# Patient Record
Sex: MALE | Race: WHITE | Employment: FULL TIME | URBAN - METROPOLITAN AREA
[De-identification: names, ages, dates, MRNs, and addresses within clinical notes are randomized per-mention and may not be internally consistent; named-entity substitution may affect disease eponyms.]

---

## 2022-01-10 ENCOUNTER — OFFICE VISIT (OUTPATIENT)
Dept: ORTHOPEDIC SURGERY | Age: 57
End: 2022-01-10
Payer: COMMERCIAL

## 2022-01-10 VITALS — BODY MASS INDEX: 27.4 KG/M2 | WEIGHT: 185 LBS | HEIGHT: 69 IN

## 2022-01-10 DIAGNOSIS — M17.12 LOCALIZED OSTEOARTHRITIS OF LEFT KNEE: Primary | ICD-10-CM

## 2022-01-10 DIAGNOSIS — M25.562 LEFT KNEE PAIN, UNSPECIFIED CHRONICITY: ICD-10-CM

## 2022-01-10 PROCEDURE — 20610 DRAIN/INJ JOINT/BURSA W/O US: CPT | Performed by: ORTHOPAEDIC SURGERY

## 2022-01-10 PROCEDURE — 99204 OFFICE O/P NEW MOD 45 MIN: CPT | Performed by: ORTHOPAEDIC SURGERY

## 2022-01-10 RX ORDER — TRIAMCINOLONE ACETONIDE 40 MG/ML
40 INJECTION, SUSPENSION INTRA-ARTICULAR; INTRAMUSCULAR ONCE
Status: COMPLETED | OUTPATIENT
Start: 2022-01-10 | End: 2022-01-10

## 2022-01-10 RX ORDER — MELOXICAM 15 MG/1
15 TABLET ORAL DAILY PRN
Qty: 30 TABLET | Refills: 0 | Status: SHIPPED | OUTPATIENT
Start: 2022-01-10 | End: 2022-01-10 | Stop reason: CLARIF

## 2022-01-10 RX ORDER — MELOXICAM 15 MG/1
15 TABLET ORAL DAILY PRN
Qty: 30 TABLET | Refills: 0 | Status: SHIPPED | OUTPATIENT
Start: 2022-01-10 | End: 2022-05-04

## 2022-01-10 RX ORDER — ROPIVACAINE HYDROCHLORIDE 5 MG/ML
20 INJECTION, SOLUTION EPIDURAL; INFILTRATION; PERINEURAL ONCE
Status: COMPLETED | OUTPATIENT
Start: 2022-01-10 | End: 2022-01-10

## 2022-01-10 RX ADMIN — ROPIVACAINE HYDROCHLORIDE 20 MG: 5 INJECTION, SOLUTION EPIDURAL; INFILTRATION; PERINEURAL at 09:17

## 2022-01-10 RX ADMIN — TRIAMCINOLONE ACETONIDE 40 MG: 40 INJECTION, SUSPENSION INTRA-ARTICULAR; INTRAMUSCULAR at 09:17

## 2022-01-10 NOTE — PROGRESS NOTES
ORTHOPAEDIC SURGERY H&P / CONSULTATION NOTE    Chief complaint: No chief complaint on file. History of present illness: The patient is a 64 y.o. male with subjective symptoms of left knee pain. The chief complaint is located at anterior aspect anterolateral aspect of the knee primarily, occasional medial based. Duration of symptoms has been for several years. The severity of symptoms is rated at 5/10 pain on intake form. Patient denies consistent use of anti-inflammatories. Denies trauma. He states dull throbbing achy pain constant with walking 5000-15,000 steps daily for his job. He states 3 previous knee arthroscopic surgeries. He states primary symptoms of dull throbbing aching pain, occasional sharp pain medial base however denies locking or catching symptoms. He denies gross mechanical twisting knee pain symptoms. He states he uses ice and heat. He has not done any therapy as of recent. The patient has tried the below listed items prior to today's consultation for above listed chief complaint.     -   Over-the-counter anti-inflammatories/prescription medication anti-inflammatory. -   Physical therapy / guided home exercise program -     -   Previous corticosteroid injections    Past medical history:  No past medical history on file. Past surgical history:  No past surgical history on file. Allergies:  Not on File      Medications: No current outpatient medications on file. Social history: Denies IV drug use.     Social History     Socioeconomic History    Marital status: Not on file     Spouse name: Not on file    Number of children: Not on file    Years of education: Not on file    Highest education level: Not on file   Occupational History    Not on file   Tobacco Use    Smoking status: Not on file    Smokeless tobacco: Not on file   Substance and Sexual Activity    Alcohol use: Not on file    Drug use: Not on file    Sexual activity: Not on file   Other Topics Concern    Not on file   Social History Narrative    Not on file     Social Determinants of Health     Financial Resource Strain:     Difficulty of Paying Living Expenses: Not on file   Food Insecurity:     Worried About Running Out of Food in the Last Year: Not on file    Nathaniel of Food in the Last Year: Not on file   Transportation Needs:     Lack of Transportation (Medical): Not on file    Lack of Transportation (Non-Medical): Not on file   Physical Activity:     Days of Exercise per Week: Not on file    Minutes of Exercise per Session: Not on file   Stress:     Feeling of Stress : Not on file   Social Connections:     Frequency of Communication with Friends and Family: Not on file    Frequency of Social Gatherings with Friends and Family: Not on file    Attends Denominational Services: Not on file    Active Member of [address] [place] [address] or Organizations: Not on file    Attends Club or Organization Meetings: Not on file    Marital Status: Not on file   Intimate Partner Violence:     Fear of Current or Ex-Partner: Not on file    Emotionally Abused: Not on file    Physically Abused: Not on file    Sexually Abused: Not on file   Housing Stability:     Unable to Pay for Housing in the Last Year: Not on file    Number of [place] in the Last Year: Not on file    Unstable Housing in the Last Year: Not on file     Tobacco use. Social History     Tobacco Use   Smoking Status Not on file   Smokeless Tobacco Not on file     Em[place]    [place]    Review of systems: Patient denies any fevers chills chest pain shortness of breath nausea vomiting significant weight loss any change in voiding or bowel movements. Patient denies any significant numbness or tingling at baseline as it relates to this presenting symptom/chief complaint. The patient denies any significant problems with skin or any significant allergies.        Physical examination:  There is no height or weight on file to calculate BMI. AAOx3, NCAT  EOMI  MMM  RR  Unlabored breathing, no wheezing  Skin intact BUE and BLE, warm and moist  Bilateral lower extremity examination specific to subjective symptoms  Exam Left Lower Extremity  Negative effusion,   0/110/0 limited secondary to pain active ROM (E/F/Lag), same P assive ROM (E/F/Lag), negative anterior Drawer, 1A Lachman, negative posterior Drawer,   Stable varus/valgus at 0 and 30?,    trace medial  TTP Joint Line, negative Emigdio, positive clarks  Skin intact throughout  5/5 IP Q H TA G EHL  SILT DP SP LP MP S S  +2 DP pulse    Diagnostic imaging:  MY READ:  4V L knee 1/10/22: Patellofemoral and medial joint space narrowingmoderate arthrosis    Pertinent lab work:  None       Diagnosis Orders   1. Left knee pain, unspecified chronicity  XR KNEE LEFT (MIN 4 VIEWS)       Assessment and plan: 64 y.o. male with current subjective symptoms and physical exam findings with diagnostic imaging correlating to left knee osteoarthritis. -Time of 16 minutes was spent coordinating and discussing the clinical findings, reviewing diagnostic imaging as indicated, coordinating care with prior notes review and current clinical encounter documentation as it pertains to the patient's presenting subjective symptoms and diagnoses. -I reviewed with the patient the imaging findings as well as clinical exam and  how it correlates to subjective symptoms.  -I had a pleasant discussion with the patient today. I reviewed with him his radiographic imaging. I also reviewed with him how this correlates to his subjective symptoms of knee osteoarthritis. He does not seem to have significant mechanical twisting knee pain at this time. I recommended conservative care treatment options to include anti-inflammatory physical therapy and consideration for corticosteroid injection versus viscosupplementation injection.   He wishes to pursue nonoperative care  -Mobic 15 mg p.o. daily as needed pain has been prescribed  -Formal physical therapy is also been prescribed for left knee. Knee reconditioning and strengthening to include pain modalities. -I recommended low impact activities such as elliptical stationary bike swimming and walking  -I offered a corticosteroid injection to treat primary inflammation associated with knee osteoarthritis. Understanding the risks and benefits of this, the patient wished to proceed. --The patient was educated to the risks (infection and skin blanching to name a few) and benefits of the injection and understanding these risks and benefits, the patient wished to proceed and a verbal consent was obtained. If the patient verbalized the presence of diabetes or rheumatologic condition on chronic immunosuppresseants as a comorbidity upon direct questioning, an additional discussion was had detailing the potential increased risk of infection and potential increase in FSBG and to monitor FSBG and adjust medications as needed.  -After sterile prep of the left knee, a 5 cc corticosteroid injection (4cc ropivicaine and 1cc kenolog 40) was administered into the intra-articular space left knee. The patient tolerated the procedure well and started to have immediate improvement in pain/symptoms.  -Pending the results of this, should the patient have significant improvement in his symptoms then consideration for transition once inflammation has been managed from therapy anti-inflammatory orally and also steroid injection for viscosupplementation injection therapy.   Should he wish to pursue this, he will contact the office in 3 to 4 months for authorizations to be placed through insurance and that he will follow-up in the office thereafter for viscosupplementation injection therapy  -All questions answered to the patient's satisfaction and the patient expressed understanding and agreement with the above listed treatment plan  -Follow up in 3 to 4 months as needed or sooner should symptoms worsen  -Thank you for the clinical consultation and allowing me to participate in the patient's care. Electronically signed by Jovany Hinojosa MD on 1/10/22 at 8:31 AM ROSANNA Hinojosa MD       Orthopaedic Surgery-Sports Medicine        Disclaimer: This note was dictated with voice recognition software. Though review and correction are routinely performed, please contact the office/medical records for any errors requiring correction.

## 2022-02-21 ENCOUNTER — OFFICE VISIT (OUTPATIENT)
Dept: ORTHOPEDIC SURGERY | Age: 57
End: 2022-02-21
Payer: COMMERCIAL

## 2022-02-21 VITALS — BODY MASS INDEX: 27.4 KG/M2 | HEIGHT: 69 IN | WEIGHT: 185 LBS

## 2022-02-21 DIAGNOSIS — M25.562 ACUTE PAIN OF LEFT KNEE: ICD-10-CM

## 2022-02-21 DIAGNOSIS — M17.12 PRIMARY OSTEOARTHRITIS OF LEFT KNEE: Primary | ICD-10-CM

## 2022-02-21 PROCEDURE — 99213 OFFICE O/P EST LOW 20 MIN: CPT | Performed by: ORTHOPAEDIC SURGERY

## 2022-02-21 NOTE — PROGRESS NOTES
FOLLOW UP ORTHOPAEDIC NOTE    The patient follows up today for reevaluation of left knee pain. The patient states previous corticosteroid injection gave 90% symptom relief for 2 weeks. He has been doing physical therapy and is transition to a home program.  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  Again, this was Mobic as an NSAID. PE:  AAOx3  RR  Unlabored breathing  Skin warm and moist  Focused physical examination of the left knee  Unchanged clinical examination     Diagnosis Orders   1. Primary osteoarthritis of left knee     2. Acute pain of left knee         Assessment and plan: 62 male with continued subjective symptoms of left knee pain with known, correlating diagnosis of left knee osteoarthritis. -Time of 16 minutes was spent coordinating and discussing the clinical findings and diagnostic imaging results as they pertain to the patient's presenting subjective symptoms.  -I had a pleasant discussion with the patient. I reviewed with him that the Mobic prescription is a nonnarcotic nonsedative medication similar to Advil which she currently takes already. The goal of Mobic would be as a compliance for once a day medication as needed as Dr. Erick Riedel. He states that he will start taking this.  -He will also continue home physical therapy that he has been taught from formal physical therapy.  -While is encouraging that he got 90% symptom leaf from the corticosteroid injection, this was with limited duration of only 2 weeks. Previous discussion and additional discussion was taken today for potential consideration of viscosupplementation injection therapy into the left knee.   He will give the anti-inflammatory and continued physical therapy 3 to 4 weeks and should he have pain he will contact the office for consideration for authorizations to be submitted to insurance for a left knee intra-articular space viscosupplementation injection therapy for treatment of left knee osteoarthritis. -Otherwise I had a pleasant discussion with the patient with regard to definitive treatment being a total knee replacement. I am hopeful that with conservative care we will continue to manage his pain accordingly and reasonably so that he is able to continue his job and also ADLs. I did review with him long-term joint registry data with regard to survivorship of  total knee arthroplasties. He expressed understanding.  -Continue activity modification to include low impact, elliptical stationary bike swimming and walking  -All questions answered to the patient's satisfaction and the patient expressed understanding and agreement with the above listed treatment plan  -Follow up in 3 to 4 weeks he will contact the office for authorizations to be submitted and follow-up thereafter for viscosupplementation injection therapy.  -Thank you for the clinical consultation and allowing me to participate in the patient's care. Electronically signed by Clemencia Zavala MD on 2/21/22 at 9:23 AM EST         Clemencia Zavala MD       Orthopaedic Surgery-Sports Medicine    Disclaimer: This note was dictated with voice recognition software. Though review and correction are routinely performed, please contact the office/medical records for any errors requiring correction.

## 2022-04-11 RX ORDER — MELOXICAM 15 MG/1
TABLET ORAL
Qty: 30 TABLET | Refills: 0 | OUTPATIENT
Start: 2022-04-11

## 2022-04-13 ENCOUNTER — TELEPHONE (OUTPATIENT)
Dept: ORTHOPEDIC SURGERY | Age: 57
End: 2022-04-13

## 2022-04-14 NOTE — TELEPHONE ENCOUNTER
Called patient back and let him know that since we haven't seen him since January, we need him to come in for a follow up to refill his medication. He will come see us next week and an appointment was made.

## 2022-04-21 ENCOUNTER — OFFICE VISIT (OUTPATIENT)
Dept: ORTHOPEDIC SURGERY | Age: 57
End: 2022-04-21
Payer: COMMERCIAL

## 2022-04-21 ENCOUNTER — TELEPHONE (OUTPATIENT)
Dept: ORTHOPEDIC SURGERY | Age: 57
End: 2022-04-21

## 2022-04-21 VITALS — BODY MASS INDEX: 27.4 KG/M2 | HEIGHT: 69 IN | WEIGHT: 185 LBS

## 2022-04-21 DIAGNOSIS — S83.272D COMPLEX TEAR OF LATERAL MENISCUS OF LEFT KNEE AS CURRENT INJURY, SUBSEQUENT ENCOUNTER: Primary | ICD-10-CM

## 2022-04-21 DIAGNOSIS — M25.562 ACUTE PAIN OF LEFT KNEE: ICD-10-CM

## 2022-04-21 PROCEDURE — 99213 OFFICE O/P EST LOW 20 MIN: CPT | Performed by: ORTHOPAEDIC SURGERY

## 2022-04-21 RX ORDER — MELOXICAM 15 MG/1
15 TABLET ORAL DAILY PRN
Qty: 30 TABLET | Refills: 0 | Status: SHIPPED | OUTPATIENT
Start: 2022-04-21

## 2022-04-21 NOTE — PROGRESS NOTES
FOLLOW UP ORTHOPAEDIC NOTE    The patient follows up today for reevaluation of left knee. The patient states 3/10 pain. He feels the previous corticosteroid injection given 3 months ago provided 6 weeks worth of 80 to 90% symptom relief. He states he has noticed more so posterior and posterior lateral mechanical twisting knee pain at this time. He presents today to discuss additional treatment options. PE:  AAOx3  RR  Unlabored breathing  Skin warm and moist  Focused physical examination of the left knee  Unchanged range of motion however patient does have positive lateral Emigdio and positive lateral joint line tenderness. Remainder of examination unchanged with regard to neurovascular     Diagnosis Orders   1. Complex tear of lateral meniscus of left knee as current injury, subsequent encounter  MRI KNEE LEFT WO CONTRAST   2. Acute pain of left knee       Assessment and plan: 62 male with continued subjective symptoms of left knee pain with known, correlating diagnosis of left knee lateral meniscus tear in the setting of knee osteoarthritis. -Time of 16 minutes was spent coordinating and discussing the clinical findings and diagnostic imaging results as they pertain to the patient's presenting subjective symptoms.  -I had a pleasant discussion with the patient. I reviewed with him at this time I would like to obtain a MRI to better look at his lateral meniscus as he does have mechanical symptoms on examination as well as subjectively he feels that he does not twist or turn the knee without pain. While patient has primarily medial and patellofemoral arthrosis which is mild to moderate, I would like to obtain MRI to look at the lateral meniscus as he has failed activity modifications previous conservative care to include anti-inflammatories and corticosteroid injection  -Continue OTC Tylenol per bottle as needed discomfort.   Mobic 15 mg p.o. daily as needed pain #30 prescribed  -Activity modification to

## 2022-04-21 NOTE — TELEPHONE ENCOUNTER
Called patient to let them know that their MRI has been authorized and that they can call and schedule scan at their convenience. Also told them that they can call and schedule a f/u with Dr. Marlena Jarvis once they have MRI scheduled, leaving at least 2-3 days for our office to receive their results.

## 2022-05-03 ENCOUNTER — OFFICE VISIT (OUTPATIENT)
Dept: ORTHOPEDIC SURGERY | Age: 57
End: 2022-05-03
Payer: COMMERCIAL

## 2022-05-03 DIAGNOSIS — M94.262 CHONDROMALACIA OF KNEE, LEFT: ICD-10-CM

## 2022-05-03 DIAGNOSIS — S83.272D COMPLEX TEAR OF LATERAL MENISCUS OF LEFT KNEE AS CURRENT INJURY, SUBSEQUENT ENCOUNTER: Primary | ICD-10-CM

## 2022-05-03 DIAGNOSIS — M25.562 ACUTE PAIN OF LEFT KNEE: ICD-10-CM

## 2022-05-03 DIAGNOSIS — S83.232D COMPLEX TEAR OF MEDIAL MENISCUS OF LEFT KNEE AS CURRENT INJURY, SUBSEQUENT ENCOUNTER: ICD-10-CM

## 2022-05-03 PROCEDURE — 99214 OFFICE O/P EST MOD 30 MIN: CPT | Performed by: ORTHOPAEDIC SURGERY

## 2022-05-03 NOTE — LETTER
[address]   DR. Manolo Heredia     TODAY'S DATE: 5/3/22    PATIENT'S NAME: Jay Wetzel    PATIENT'S NUMBER: [id number]    : 1965    PREFERRED PHONE NUMBER: [phone]      WORK PHONE NUMBER: There is no work phone number on file. DIAGNOSIS:   1. Complex tear of lateral meniscus of left knee as current injury, subsequent encounter    2. Complex tear of medial meniscus of left knee as current injury, subsequent encounter    3. Chondromalacia of knee, left    4.  Acute pain of left knee        PROCEDURE: Left knee arthroscopic partial meniscectomy versus meniscal repair/root repair    SURGEON: Dr. Rivas Patches: Elective    [facility]: [facility]    ADMISSION STATUS: Outpatient/Same Day Surgery    ANESTHESIA: General  Pre-Op Block requested NONE    LENGTH OF SURGERY: 39 Min    EQUIPMENT REQUESTED: None      X-RAYS REQUIRED: Mini C-ARM    ANTIBIOTICS: Ancef 2gm IV x 1    MEDICATIONS: TXA 1gm IV x1 at induction of anesthesia AND TXA 1gm IV x 1 at wound closure    LABS: None          PCP: None Provider    H&P TO BE DONE BY THE PCP      CARDIAC CLEARANCE NEEDED: No     ALLERGIES: No Known Allergies    DME: Crutches and Knee Bregg Hinged Knee Brace    POST-OP VISIT: 10-12 days    OTHER INSTRUCTIONS/REMARKS: Arthrex root repair kit    INSURANCE INFORMATION: _________________________ CARD IN MEDIA IN EPIC___  CARD FAXED___    PRE-CERTIFICATION REQUIRED: YES   NO   PER _______________________    SURGERY SCHEDULED BY: ____________________________________    PATIENT CALLED AND CONFIRMED DATE & TIME: ______________________             Bryn Riddle MD       Orthopaedic Surgery - Sports Medicine

## 2022-05-03 NOTE — PROGRESS NOTES
FOLLOW UP ORTHOPAEDIC NOTE    The patient follows up today for reevaluation of left knee pain. The patient states 7/10 pain in the left knee. He states continued left knee pain mechanical twisting knee pain. He received his MRI last week and follows up for results. He states the pain is significantly affecting his ADLs and does not wish to go on with his knee function being limited as it is. PE:  AAOx3  RR  Unlabored breathing  Skin warm and moist  Focused physical examination of the left knee  Positive lateral joint line tenderness, trace medial joint line tenderness. Positive Domenico's  Remainder of examination unchanged    Pertinent radiographs/imaging:  MRI left knee 4/29/2022  Impression   CONCLUSION:   Small undersurface tear of the posterior horn/body junction of the medial meniscus and sequela of prior   meniscocapsular injury. Partial root avulsion of the anterior horn of the lateral meniscus. Severe mucoid degeneration of the ACL. Intact PCL. Grade 3-4 chondromalacia in the medial weight-bearing femorotibial compartment. Moderate knee joint effusion.          MY READ: ACL with mucoid degeneration albeit with fiber still present and correctly aligned. PCL intact MCL LCL intact. High-grade chondromalacia medial compartment, very mild chondromalacia patella. Positive articular sided posterior horn medial meniscus tear. Positive anterior lateral meniscal horn complex tear without associated ganglion cyst.  Intrameniscal ligament anteriorly is intact. No gross extrusion laterally. Very slight medial extrusion for the medial meniscus. Posterior root intact for medial and lateral meniscus. Possible involvement near the root anterior horn. Very small radial tear in the posterior horn lateral meniscus as this is appreciated on 1 image     Diagnosis Orders   1. Complex tear of lateral meniscus of left knee as current injury, subsequent encounter     2.  Complex tear of medial meniscus of left knee as current injury, subsequent encounter     3. Chondromalacia of knee, left     4. Acute pain of left knee         Assessment and plan: 62 male with continued subjective symptoms of left knee pain with known, correlating diagnosis of left knee lateral meniscus tear, medial meniscus tear in the setting of left knee chondromalacia. -Time of 23 minutes was spent coordinating and discussing the clinical findings and diagnostic imaging results as they pertain to the patient's presenting subjective symptoms.  -I had a pleasant discussion with the patient today and really reviewed with him his MRI findings. We reviewed how this correlates to his lateral based knee pain although where he has posterior lateral knee pain there is a very minimal small radial tear there. He does have anterior lateral complex meniscal tear as well near the root with possible partial involvement otherwise intrameniscal ligament is intact and there is no gross extrusion. I reviewed with him consideration for continued conservative care given continued mechanical twisting pain which he states significantly affect his ADLs and he is not wishing to move forward as he \" cannot live like this\". I reviewed with him consideration for left knee arthroscopic partial meniscectomy versus meniscal repair to include the root. Understanding the perioperative course and what this would entail as well as risks and benefits of nonoperative versus operative treatment measures he wishes to pursue surgery. I did review with him consideration for treatment measures for chondromalacia related symptoms which can always be managed postoperatively as well however given his mechanical twisting knee pain and still clinical examination findings, at this time he wishes to proceed with surgery.   --Currently the patient wishes to proceed with surgery given left knee symptoms from left knee mechanical twisting knee pain  as assessed by clinical exam and diagnostic imaging, which correlates to subjective symptoms. Understanding the risks and benefits of nonoperative versus operative treatments, he wishes to pursue surgery. Benefits of decreased pain and improved function were discussed with the patient as well as potential risks which included but were not limited to damage to nerves arteries tendons veins infection bleeding continued pain, new onset pain, stiffness, loss of range of motion, painful scar tissue, ligament instability, instability, chondromalacia related symptoms, need for revision surgery, need for additional surgery, venous thromboembolism, and ultimately death. Understanding this, a signed document consent will be placed in the patient's chart for left knee arthroscopic partial meniscectomy versus meniscal repair/root repair and chondroplasty.  -Continue activity modification to include low impact. Discontinue anti-inflammatory use at this time. OTC Tylenol per bottle parent discomfort. Ice and heat for symptomatic relief  -All questions answered to the patient's satisfaction and the patient expressed understanding and agreement with the above listed treatment plan  -Follow up in 10 to 12 days postop per routine  -Thank you for the clinical consultation and allowing me to participate in the patient's care. Electronically signed by Annika Marino MD on 5/3/22 at 4:45 PM EDT         Annika Marino MD       Orthopaedic Surgery-Sports Medicine    Disclaimer: This note was dictated with voice recognition software. Though review and correction are routinely performed, please contact the office/medical records for any errors requiring correction.

## 2022-05-04 ENCOUNTER — TELEPHONE (OUTPATIENT)
Dept: ORTHOPEDIC SURGERY | Age: 57
End: 2022-05-04

## 2022-05-04 NOTE — TELEPHONE ENCOUNTER
Auth: NPR  Date: 05/11/22  Reference # [id number]  Spoke with: TATYANA/JENNIE  Type of SX: OUTPATIENT  Location: [facility]  CPT: 20364, 55922   SX area: L KNEE  Insurance: JENNIE GIBSON OF [place]

## 2022-05-04 NOTE — PROGRESS NOTES
Leidy Carringtones    Age 62 y.o.    male    1965    MRN [id number]    5/11/2022  Arrival Time_____________  OR Time____________[address]     Procedure(s):  VIDEO ARTHROSCOPY LEFT KNEE, PARTIAL MENISCECTOMY VERSUS MENISCAL REPAIR/ROOT REPAIR                      General    Surgeon(s):  Marshal Andrews, MD       Phone [phone] ([place])     [address]  Cell         Work  _____________________________________________________________________  _____________________________________________________________________  _____________________________________________________________________  _____________________________________________________________________  _____________________________________________________________________    PCP _____________________________ Phone_________________     H&P__________________Bringing      Chart            Epic   DOS      Called________  EKG__________________Bringing      Chart            Epic   DOS      Called________  LAB__________________ Bringing      Chart            Epic   DOS      Called________  Cardiac Clearance_______Bringing      Chart            Epic      DOS      Called________    Cardiologist________________________ Phone___________________________    ? Yarsani concerns / Waiver on Chart            PAT Communications________________  ? Pre-op Instructions Given [place]          _________________________________  ? Directions to Surgery Center                          _________________________________  ? Transportation Home_______________      __________________________________  ?  Crutches/Walker__________________        __________________________________    ________Pre-op Orders   _______Transcribed    _______Wt.  ________Pharmacy          _______SCD  ______VTE     ______TED [facility]  _______  Surgery Consent    _______  Anesthesia Consent         COVID DATE______________LOCATION________________ RESULT__________

## 2022-05-04 NOTE — PROGRESS NOTES

## 2022-05-04 NOTE — PROGRESS NOTES
Obstructive Sleep Apnea (CHIN) Screening     Patient:  Francesco Brownlee    YOB: 1965      Medical Record #:  [id number]                     Date:  5/4/2022     1. Are you a loud and/or regular snorer? []  Yes       [x] No    2. Have you been observed to gasp or stop breathing during sleep? []  Yes       [x] No    3. Do you feel tired or groggy upon awakening or do you awaken with a headache?           []  Yes       [] No    4. Are you often tired or fatigued during the wake time hours? []  Yes       [] No    5. Do you fall asleep sitting, reading, watching TV or driving? []  Yes       [] No    6. Do you often have problems with memory or concentration? []  Yes       [] No    **If patient's score is ? 3 they are considered high risk for CHIN. An Anesthesia provider will evaluate the patient and develop a plan of care the day of surgery. Note:  If the patient's BMI is more than 35 kg m¯² , has neck circumference > 40 cm, and/or high blood pressure the risk is greater (© [employer], 2006).

## 2022-05-09 ENCOUNTER — TELEPHONE (OUTPATIENT)
Dept: ORTHOPEDIC SURGERY | Age: 57
End: 2022-05-09

## 2022-05-09 NOTE — TELEPHONE ENCOUNTER
General Question     Subject: [facility] has not sent him paperwork for his SX.   Patient:German Chicas Number: [phone]

## 2022-05-09 NOTE — TELEPHONE ENCOUNTER
Called patient back and he states that someone told him there was something to fill out online. I'm not sure what paperwork this could be as I do not have patients fill out online work. He wasn't sure and just wanted to check that he would be good to go after his H&P today.

## 2022-05-10 ENCOUNTER — ANESTHESIA EVENT (OUTPATIENT)
Dept: OPERATING ROOM | Age: 57
End: 2022-05-10
Payer: COMMERCIAL

## 2022-05-11 ENCOUNTER — ANESTHESIA (OUTPATIENT)
Dept: OPERATING ROOM | Age: 57
End: 2022-05-11
Payer: COMMERCIAL

## 2022-05-11 ENCOUNTER — HOSPITAL ENCOUNTER (OUTPATIENT)
Age: 57
Setting detail: OUTPATIENT SURGERY
Discharge: HOME | End: 2022-05-11
Attending: ORTHOPAEDIC SURGERY | Admitting: ORTHOPAEDIC SURGERY
Payer: COMMERCIAL

## 2022-05-11 VITALS
BODY MASS INDEX: 27.4 KG/M2 | DIASTOLIC BLOOD PRESSURE: 93 MMHG | OXYGEN SATURATION: 99 % | HEIGHT: 69 IN | SYSTOLIC BLOOD PRESSURE: 129 MMHG | WEIGHT: 185 LBS | TEMPERATURE: 96.9 F | RESPIRATION RATE: 14 BRPM | HEART RATE: 66 BPM

## 2022-05-11 VITALS
OXYGEN SATURATION: 98 % | SYSTOLIC BLOOD PRESSURE: 83 MMHG | RESPIRATION RATE: 6 BRPM | DIASTOLIC BLOOD PRESSURE: 49 MMHG

## 2022-05-11 DIAGNOSIS — Z47.89 ORTHOPEDIC AFTERCARE: Primary | ICD-10-CM

## 2022-05-11 PROCEDURE — 2500000003 HC RX 250 WO HCPCS: Performed by: ORTHOPAEDIC SURGERY

## 2022-05-11 PROCEDURE — 7100000000 HC PACU RECOVERY - FIRST 15 MIN: Performed by: ORTHOPAEDIC SURGERY

## 2022-05-11 PROCEDURE — 6360000002 HC RX W HCPCS: Performed by: ORTHOPAEDIC SURGERY

## 2022-05-11 PROCEDURE — 2720000010 HC SURG SUPPLY STERILE: Performed by: ORTHOPAEDIC SURGERY

## 2022-05-11 PROCEDURE — 6360000002 HC RX W HCPCS: Performed by: NURSE ANESTHETIST, CERTIFIED REGISTERED

## 2022-05-11 PROCEDURE — 7100000001 HC PACU RECOVERY - ADDTL 15 MIN: Performed by: ORTHOPAEDIC SURGERY

## 2022-05-11 PROCEDURE — 2580000003 HC RX 258: Performed by: ANESTHESIOLOGY

## 2022-05-11 PROCEDURE — 3700000001 HC ADD 15 MINUTES (ANESTHESIA): Performed by: ORTHOPAEDIC SURGERY

## 2022-05-11 PROCEDURE — 3600000014 HC SURGERY LEVEL 4 ADDTL 15MIN: Performed by: ORTHOPAEDIC SURGERY

## 2022-05-11 PROCEDURE — 6360000002 HC RX W HCPCS

## 2022-05-11 PROCEDURE — 3600000004 HC SURGERY LEVEL 4 BASE: Performed by: ORTHOPAEDIC SURGERY

## 2022-05-11 PROCEDURE — 3700000000 HC ANESTHESIA ATTENDED CARE: Performed by: ORTHOPAEDIC SURGERY

## 2022-05-11 PROCEDURE — 7100000011 HC PHASE II RECOVERY - ADDTL 15 MIN: Performed by: ORTHOPAEDIC SURGERY

## 2022-05-11 PROCEDURE — 29880 ARTHRS KNE SRG MNISECTMY M&L: CPT | Performed by: ORTHOPAEDIC SURGERY

## 2022-05-11 PROCEDURE — 2709999900 HC NON-CHARGEABLE SUPPLY: Performed by: ORTHOPAEDIC SURGERY

## 2022-05-11 PROCEDURE — 7100000010 HC PHASE II RECOVERY - FIRST 15 MIN: Performed by: ORTHOPAEDIC SURGERY

## 2022-05-11 PROCEDURE — 2580000003 HC RX 258: Performed by: ORTHOPAEDIC SURGERY

## 2022-05-11 PROCEDURE — 2500000003 HC RX 250 WO HCPCS: Performed by: NURSE ANESTHETIST, CERTIFIED REGISTERED

## 2022-05-11 RX ORDER — LABETALOL HYDROCHLORIDE 5 MG/ML
5 INJECTION, SOLUTION INTRAVENOUS EVERY 10 MIN PRN
Status: DISCONTINUED | OUTPATIENT
Start: 2022-05-11 | End: 2022-05-11 | Stop reason: HOSPADM

## 2022-05-11 RX ORDER — SODIUM CHLORIDE 0.9 % (FLUSH) 0.9 %
5-40 SYRINGE (ML) INJECTION EVERY 12 HOURS SCHEDULED
Status: DISCONTINUED | OUTPATIENT
Start: 2022-05-11 | End: 2022-05-11 | Stop reason: HOSPADM

## 2022-05-11 RX ORDER — PROPOFOL 10 MG/ML
INJECTION, EMULSION INTRAVENOUS PRN
Status: DISCONTINUED | OUTPATIENT
Start: 2022-05-11 | End: 2022-05-11 | Stop reason: SDUPTHER

## 2022-05-11 RX ORDER — SODIUM CHLORIDE 9 MG/ML
INJECTION, SOLUTION INTRAVENOUS PRN
Status: DISCONTINUED | OUTPATIENT
Start: 2022-05-11 | End: 2022-05-11 | Stop reason: HOSPADM

## 2022-05-11 RX ORDER — OXYCODONE HYDROCHLORIDE 5 MG/1
5 TABLET ORAL PRN
Status: DISCONTINUED | OUTPATIENT
Start: 2022-05-11 | End: 2022-05-11 | Stop reason: HOSPADM

## 2022-05-11 RX ORDER — SODIUM CHLORIDE, SODIUM LACTATE, POTASSIUM CHLORIDE, CALCIUM CHLORIDE 600; 310; 30; 20 MG/100ML; MG/100ML; MG/100ML; MG/100ML
INJECTION, SOLUTION INTRAVENOUS CONTINUOUS
Status: DISCONTINUED | OUTPATIENT
Start: 2022-05-11 | End: 2022-05-11 | Stop reason: HOSPADM

## 2022-05-11 RX ORDER — ONDANSETRON 2 MG/ML
INJECTION INTRAMUSCULAR; INTRAVENOUS PRN
Status: DISCONTINUED | OUTPATIENT
Start: 2022-05-11 | End: 2022-05-11 | Stop reason: SDUPTHER

## 2022-05-11 RX ORDER — ONDANSETRON 2 MG/ML
4 INJECTION INTRAMUSCULAR; INTRAVENOUS
Status: DISCONTINUED | OUTPATIENT
Start: 2022-05-11 | End: 2022-05-11 | Stop reason: HOSPADM

## 2022-05-11 RX ORDER — LIDOCAINE HYDROCHLORIDE 10 MG/ML
INJECTION, SOLUTION INFILTRATION; PERINEURAL PRN
Status: DISCONTINUED | OUTPATIENT
Start: 2022-05-11 | End: 2022-05-11 | Stop reason: SDUPTHER

## 2022-05-11 RX ORDER — FENTANYL CITRATE 50 UG/ML
INJECTION, SOLUTION INTRAMUSCULAR; INTRAVENOUS PRN
Status: DISCONTINUED | OUTPATIENT
Start: 2022-05-11 | End: 2022-05-11 | Stop reason: SDUPTHER

## 2022-05-11 RX ORDER — LIDOCAINE HYDROCHLORIDE 10 MG/ML
0.3 INJECTION, SOLUTION EPIDURAL; INFILTRATION; INTRACAUDAL; PERINEURAL
Status: DISCONTINUED | OUTPATIENT
Start: 2022-05-11 | End: 2022-05-11 | Stop reason: HOSPADM

## 2022-05-11 RX ORDER — DIPHENHYDRAMINE HYDROCHLORIDE 50 MG/ML
12.5 INJECTION INTRAMUSCULAR; INTRAVENOUS
Status: DISCONTINUED | OUTPATIENT
Start: 2022-05-11 | End: 2022-05-11 | Stop reason: HOSPADM

## 2022-05-11 RX ORDER — SODIUM CHLORIDE 0.9 % (FLUSH) 0.9 %
5-40 SYRINGE (ML) INJECTION PRN
Status: DISCONTINUED | OUTPATIENT
Start: 2022-05-11 | End: 2022-05-11 | Stop reason: HOSPADM

## 2022-05-11 RX ORDER — DEXAMETHASONE SODIUM PHOSPHATE 10 MG/ML
INJECTION INTRAMUSCULAR; INTRAVENOUS PRN
Status: DISCONTINUED | OUTPATIENT
Start: 2022-05-11 | End: 2022-05-11 | Stop reason: SDUPTHER

## 2022-05-11 RX ORDER — TRANEXAMIC ACID 100 MG/ML
1000 INJECTION, SOLUTION INTRAVENOUS ONCE
Status: DISCONTINUED | OUTPATIENT
Start: 2022-05-11 | End: 2022-05-11 | Stop reason: HOSPADM

## 2022-05-11 RX ORDER — HYDROCODONE BITARTRATE AND ACETAMINOPHEN 5; 325 MG/1; MG/1
1 TABLET ORAL EVERY 6 HOURS PRN
Qty: 28 TABLET | Refills: 0 | Status: SHIPPED | OUTPATIENT
Start: 2022-05-11 | End: 2022-05-16

## 2022-05-11 RX ORDER — MEPERIDINE HYDROCHLORIDE 50 MG/ML
12.5 INJECTION INTRAMUSCULAR; INTRAVENOUS; SUBCUTANEOUS EVERY 5 MIN PRN
Status: DISCONTINUED | OUTPATIENT
Start: 2022-05-11 | End: 2022-05-11 | Stop reason: HOSPADM

## 2022-05-11 RX ORDER — OXYCODONE HYDROCHLORIDE 5 MG/1
10 TABLET ORAL PRN
Status: DISCONTINUED | OUTPATIENT
Start: 2022-05-11 | End: 2022-05-11 | Stop reason: HOSPADM

## 2022-05-11 RX ORDER — MIDAZOLAM HYDROCHLORIDE 1 MG/ML
INJECTION INTRAMUSCULAR; INTRAVENOUS PRN
Status: DISCONTINUED | OUTPATIENT
Start: 2022-05-11 | End: 2022-05-11 | Stop reason: SDUPTHER

## 2022-05-11 RX ORDER — BUPIVACAINE HYDROCHLORIDE 2.5 MG/ML
INJECTION, SOLUTION INFILTRATION; PERINEURAL PRN
Status: DISCONTINUED | OUTPATIENT
Start: 2022-05-11 | End: 2022-05-11 | Stop reason: ALTCHOICE

## 2022-05-11 RX ORDER — TRANEXAMIC ACID 100 MG/ML
1000 INJECTION, SOLUTION INTRAVENOUS ONCE
Status: COMPLETED | OUTPATIENT
Start: 2022-05-11 | End: 2022-05-11

## 2022-05-11 RX ADMIN — TRANEXAMIC ACID 1000 MG: 100 INJECTION, SOLUTION INTRAVENOUS at 10:53

## 2022-05-11 RX ADMIN — DEXAMETHASONE SODIUM PHOSPHATE 5 MG: 10 INJECTION INTRAMUSCULAR; INTRAVENOUS at 11:17

## 2022-05-11 RX ADMIN — LIDOCAINE HYDROCHLORIDE 60 MG: 10 INJECTION, SOLUTION INFILTRATION; PERINEURAL at 10:48

## 2022-05-11 RX ADMIN — FENTANYL CITRATE 50 MCG: 50 INJECTION INTRAMUSCULAR; INTRAVENOUS at 10:48

## 2022-05-11 RX ADMIN — CEFAZOLIN SODIUM 2000 MG: 10 INJECTION, POWDER, FOR SOLUTION INTRAVENOUS at 10:38

## 2022-05-11 RX ADMIN — ONDANSETRON 4 MG: 2 INJECTION INTRAMUSCULAR; INTRAVENOUS at 11:17

## 2022-05-11 RX ADMIN — MIDAZOLAM HYDROCHLORIDE 2 MG: 2 INJECTION, SOLUTION INTRAMUSCULAR; INTRAVENOUS at 10:40

## 2022-05-11 RX ADMIN — Medication 0.5 MG: at 12:11

## 2022-05-11 RX ADMIN — PROPOFOL 160 MG: 10 INJECTION, EMULSION INTRAVENOUS at 10:48

## 2022-05-11 RX ADMIN — FENTANYL CITRATE 50 MCG: 50 INJECTION INTRAMUSCULAR; INTRAVENOUS at 11:02

## 2022-05-11 RX ADMIN — HYDROMORPHONE HYDROCHLORIDE 0.5 MG: 1 INJECTION, SOLUTION INTRAMUSCULAR; INTRAVENOUS; SUBCUTANEOUS at 12:11

## 2022-05-11 RX ADMIN — SODIUM CHLORIDE, POTASSIUM CHLORIDE, SODIUM LACTATE AND CALCIUM CHLORIDE: 600; 310; 30; 20 INJECTION, SOLUTION INTRAVENOUS at 08:15

## 2022-05-11 ASSESSMENT — PAIN - FUNCTIONAL ASSESSMENT: PAIN_FUNCTIONAL_ASSESSMENT: 0-10

## 2022-05-11 ASSESSMENT — PULMONARY FUNCTION TESTS
PIF_VALUE: 12
PIF_VALUE: 29
PIF_VALUE: 12
PIF_VALUE: 15
PIF_VALUE: 12
PIF_VALUE: 0
PIF_VALUE: 12
PIF_VALUE: 3
PIF_VALUE: 12
PIF_VALUE: 2
PIF_VALUE: 12
PIF_VALUE: 20
PIF_VALUE: 5
PIF_VALUE: 12
PIF_VALUE: 12
PIF_VALUE: 2
PIF_VALUE: 12
PIF_VALUE: 1
PIF_VALUE: 12
PIF_VALUE: 0
PIF_VALUE: 12
PIF_VALUE: 12
PIF_VALUE: 1
PIF_VALUE: 12
PIF_VALUE: 12
PIF_VALUE: 2
PIF_VALUE: 0
PIF_VALUE: 16
PIF_VALUE: 21
PIF_VALUE: 12
PIF_VALUE: 12

## 2022-05-11 ASSESSMENT — PAIN DESCRIPTION - LOCATION: LOCATION: KNEE

## 2022-05-11 ASSESSMENT — PAIN SCALES - GENERAL
PAINLEVEL_OUTOF10: 0
PAINLEVEL_OUTOF10: 3
PAINLEVEL_OUTOF10: 0
PAINLEVEL_OUTOF10: 7
PAINLEVEL_OUTOF10: 0
PAINLEVEL_OUTOF10: 0
PAINLEVEL_OUTOF10: 7
PAINLEVEL_OUTOF10: 0

## 2022-05-11 ASSESSMENT — PAIN DESCRIPTION - DESCRIPTORS
DESCRIPTORS: ACHING
DESCRIPTORS: THROBBING;DISCOMFORT

## 2022-05-11 ASSESSMENT — PAIN DESCRIPTION - ORIENTATION: ORIENTATION: LEFT

## 2022-05-11 NOTE — ANESTHESIA POSTPROCEDURE EVALUATION
Department of Anesthesiology  Postprocedure Note    Patient: Montana De  MRN: [id number]  YOB: 1965  Date of evaluation: 5/11/2022  Time:  2:36 PM     Procedure Summary     Date: 05/11/22 Room / Location: [address] [place] [address]    Anesthesia Start: 7668 Anesthesia Stop: 1889    Procedure: VIDEO ARTHROSCOPY LEFT KNEE, PARTIAL MEDIAL AND LATERAL MENISCECTOMIES, PATELLAR CHONDROPLASTY (Left Knee) Diagnosis:       Complex tear of lateral meniscus of left knee as current injury, subsequent encounter      Complex tear of medial meniscus of left knee as current injury, subsequent encounter      Chondromalacia of left knee      (Complex tear of lateral meniscus of left knee as current injury, subsequent encounter [S83.272D] Complex tear of medial meniscus of left knee as current injury, subsequent encounter [S83.232D] Chondromalacia of left knee [A53.135])    Surgeons: Roland Steinberg MD Responsible Provider: Boo Crain MD    Anesthesia Type: general ASA Status: 1          Anesthesia Type: No value filed. Marlo Phase I: Marlo Score: 10    Marlo Phase II: Marlo Score: 10    Last vitals: Reviewed and per EMR flowsheets.        Anesthesia Post Evaluation    Comments: Postoperative Anesthesia Note    Name:    Montana De  MRN:      [id number]    Patient Vitals in the past 12 hrs:  05/11/22 1230, BP:(!) 129/93, Pulse:66, Resp:14, SpO2:99 %  05/11/22 1215, BP:126/86, Pulse:77, Resp:14, SpO2:100 %  05/11/22 1200, BP:105/83, Pulse:64, Resp:14, SpO2:95 %  05/11/22 1155, BP:105/83, Pulse:61, Resp:14, SpO2:97 %  05/11/22 1152, BP:105/69, Temp:96.9 °F (36.1 °C), Pulse:60, Resp:14, SpO2:97 %  05/11/22 1147, BP:103/67, Pulse:61, Resp:14, SpO2:97 %  05/11/22 1142, BP:(!) 135/114, Temp:96.8 °F (36 °C), Temp src:Temporal, Pulse:64, Resp:14, SpO2:97 %  05/11/22 0812, BP:138/88, Temp:96.8 °F (36 °C), Pulse:60, Resp:16, SpO2:97 %     LABS:    CBC  No results found for: WBC, HGB, HCT, PLT  RENAL  No results found for: NA, K, CL, CO2, BUN, CREATININE, GLUCOSE  COAGS  No results found for: PROTIME, INR, APTT    Intake & Output:  @42JUHR@    Nausea & Vomiting:  No    Level of Consciousness:  Awake    Pain Assessment:  Adequate analgesia    Anesthesia Complications:  No apparent anesthetic complications    SUMMARY      Vital signs stable  OK to discharge from Stage I post anesthesia care.   Care transferred from Anesthesiology department on discharge from perioperative area

## 2022-05-11 NOTE — PROGRESS NOTES
Discharge instructions reviewed with patient/responsible adult and understanding verbalized. Discharge instructions signed and copies given. Patient discharged home with belongings. Patient states he has crutches at home and knows how to use them.

## 2022-05-11 NOTE — ANESTHESIA PRE PROCEDURE
Department of Anesthesiology  Preprocedure Note       Name:  Esmer Rodriguez   Age:  62 y.o.  :  1965                                          MRN:  [id number]         Date:  2022      Surgeon: Kandy Causey):  Anna Ch MD    Procedure: Procedure(s):  VIDEO ARTHROSCOPY LEFT KNEE, PARTIAL MENISCECTOMY VERSUS MENISCAL REPAIR/ROOT REPAIR    Medications prior to admission:   Prior to Admission medications    Medication Sig Start Date End Date Taking? Authorizing Provider   Ibuprofen (ADVIL PO) Take by mouth as needed   Yes Historical Provider, MD   meloxicam (MOBIC) 15 MG tablet Take 1 tablet by mouth daily as needed for Pain 22   Anna Ch MD       Current medications:    Current Facility-Administered Medications   Medication Dose Route Frequency Provider Last Rate Last Admin    ceFAZolin (ANCEF) 2000 mg in dextrose 5 % 100 mL IVPB  2,000 mg IntraVENous On Call to [address] [place] [address] MD        tranexamic acid (CYKLOKAPRON) injection 1,000 mg  1,000 mg IntraVENous Once Anna Ch MD        tranexamic acid (CYKLOKAPRON) injection 1,000 mg  1,000 mg IntraVENous Once Anna Ch MD        lidocaine PF 1 % injection 0.3 mL  0.3 mL IntraDERmal Once PRN Francisco Barron MD        lactated ringers infusion   IntraVENous Continuous Francisco Barron MD        sodium chloride flush 0.9 % injection 5-40 mL  5-40 mL IntraVENous 2 times per day Francisco Barron MD        sodium chloride flush 0.9 % injection 5-40 mL  5-40 mL IntraVENous PRN Francisco Barron MD        0.9 % sodium chloride infusion   IntraVENous PRN Francisco Barron MD           Allergies:  No Known Allergies    Problem List:  There is no problem list on file for this patient.       Past Medical History:        Diagnosis Date    Arthritis        Past Surgical History:        Procedure Laterality Date    FRACTURE SURGERY      C5-7    HERNIA REPAIR      X 2    KNEE ARTHROSCOPY Bilateral     R X 2 and L X 3       Social History:    Social History     Tobacco Use    Smoking status: Never Smoker    Smokeless tobacco: Current User     Types: Chew   Substance Use Topics    Alcohol use: Not Currently                                Ready to quit: Not Answered  Counseling given: Not Answered      Vital Signs (Current):   Vitals:    05/04/22 1542   Weight: 185 lb (83.9 kg)   Height: 5' 9\" (1.753 m)                                              BP Readings from Last 3 Encounters:   No data found for BP       NPO Status:                                                                                 BMI:   Wt Readings from Last 3 Encounters:   05/04/22 185 lb (83.9 kg)   04/21/22 185 lb (83.9 kg)   02/21/22 185 lb (83.9 kg)     Body mass index is 27.32 kg/m². CBC: No results found for: WBC, RBC, HGB, HCT, MCV, RDW, PLT    CMP: No results found for: NA, K, CL, CO2, BUN, CREATININE, GFRAA, AGRATIO, LABGLOM, GLUCOSE, GLU, PROT, CALCIUM, BILITOT, ALKPHOS, AST, ALT    POC Tests: No results for input(s): POCGLU, POCNA, POCK, POCCL, POCBUN, POCHEMO, POCHCT in the last 72 hours.     Coags: No results found for: PROTIME, INR, APTT    HCG (If Applicable): No results found for: PREGTESTUR, PREGSERUM, HCG, HCGQUANT     ABGs: No results found for: PHART, PO2ART, INY5XHY, WAZ2SMZ, BEART, V4SYNTVL     Type & Screen (If Applicable):  No results found for: LABABO, LABRH    Drug/Infectious Status (If Applicable):  No results found for: HIV, HEPCAB    COVID-19 Screening (If Applicable): No results found for: COVID19        Anesthesia Evaluation  Patient summary reviewed no history of anesthetic complications:   Airway: Mallampati: III  TM distance: >3 FB   Neck ROM: full  Mouth opening: > = 3 FB Dental: normal exam         Pulmonary:normal exam  breath sounds clear to auscultation      (-) COPD, asthma and sleep apnea                           Cardiovascular:  Exercise tolerance: good (>4 METS),       (-) hypertension, CAD,  angina and  MARQUEZ      Rhythm: regular  Rate: normal                    Neuro/Psych:      (-) seizures and TIA           GI/Hepatic/Renal:        (-) GERD, liver disease and no renal disease       Endo/Other:        (-) diabetes mellitus               Abdominal:             Vascular: Other Findings:        Pre-Operative Diagnosis: Complex tear of lateral meniscus of left knee as current injury, subsequent encounter Imer Shaw; Complex tear of medial meniscus of left knee as current injury, subsequent encounter [S83.232D]; Chondromalacia of left knee [M94.262]    62 y.o.   BMI:  Body mass index is 27.32 kg/m². Vitals:    05/04/22 1542 05/11/22 0812   BP:  138/88   Pulse:  60   Resp:  16   Temp:  96.8 °F (36 °C)   SpO2:  97%   Weight: 185 lb (83.9 kg)    Height: 5' 9\" (1.753 m)        No Known Allergies    Social History     Tobacco Use    Smoking status: Never Smoker    Smokeless tobacco: Current User     Types: Chew   Substance Use Topics    Alcohol use: Not Currently       LABS:    CBC  No results found for: WBC, HGB, HCT, PLT  RENAL  No results found for: NA, K, CL, CO2, BUN, CREATININE, GLUCOSE  COAGS  No results found for: PROTIME, INR, APTT          Anesthesia Plan      general     ASA 1     (I discussed with the patient the risks and benefits of PIV, anesthesia, IV Narcotics, PACU. All questions were answered the patient agrees with the plan and wishes to proceed)  Induction: intravenous.                           Elvia Bruno MD   5/11/2022

## 2022-05-11 NOTE — H&P
ORTHOPAEDIC SURGERY INTERVAL H&P    Reji Fermin was seen in the preoperative area, where a history and physical examination was reviewed and the patient was examined by me today. There have been no significant clinical changes since the completion of the previous recorded history and physical dated 1/10/22 and additional appointments 4/22 and 5/22. The surgical site was confirmed by the patient and me and the surgical site was marked. The risks, benefits, and alternatives of the proposed procedure(s) have been explained to the patient (or appropriately confirmed guardian) and understanding was verbalized. Please see outpatient notes for details. All questions were answered and a signed documented consent has been placed in the patient's chart. The patient wishes to proceed. On call to the OR. Electronically signed by: Sherman Greene MD,5/11/2022,10:45 AM         Sherman Greene MD       Orthopaedic Surgery-Sports Medicine      Disclaimer: This note was dictated with voice recognition software. Though review and correction are routinely performed, please contact the office/medical records for any errors requiring correction.

## 2022-05-12 ENCOUNTER — TELEPHONE (OUTPATIENT)
Dept: ORTHOPEDIC SURGERY | Age: 57
End: 2022-05-12

## 2022-05-12 NOTE — TELEPHONE ENCOUNTER
The patient is calling to let Dr. Kim Jamil know that everything is fine and appreciates everything he did.

## 2022-05-12 NOTE — TELEPHONE ENCOUNTER
Called patient back to thank you and let him know that Dr. Catia Bailey will call him tomorrow or Saturday to check on him as well.

## 2022-05-19 RX ORDER — MELOXICAM 15 MG/1
TABLET ORAL
Qty: 30 TABLET | Refills: 0 | OUTPATIENT
Start: 2022-05-19

## 2022-05-23 ENCOUNTER — OFFICE VISIT (OUTPATIENT)
Dept: ORTHOPEDIC SURGERY | Age: 57
End: 2022-05-23

## 2022-05-23 VITALS — BODY MASS INDEX: 27.4 KG/M2 | HEIGHT: 69 IN | WEIGHT: 185 LBS

## 2022-05-23 DIAGNOSIS — Z47.89 ORTHOPEDIC AFTERCARE: Primary | ICD-10-CM

## 2022-05-23 PROCEDURE — 99024 POSTOP FOLLOW-UP VISIT: CPT | Performed by: ORTHOPAEDIC SURGERY

## 2022-05-23 NOTE — LETTER
[address] [place] [id number]  Phone: [phone]  Fax: [phone]    Angelique Wiley MD        May 23, 2022     Patient: Jasmin Corporal   YOB: 1965   Date of Visit: 5/23/2022       To Whom It May Concern: It is my medical opinion that Aidan Sheriff may return to work on June 1st, 2022. This is as tolerated. If you have any questions or concerns, please don't hesitate to call.     Sincerely,           Angelique Wiley MD       Orthopaedic Surgery-Sports Medicine      Angelique Wiley MD

## 2022-05-23 NOTE — PROGRESS NOTES
and transition to home exercise program.  Ultimately he will continue to work on can full terminal knee extension which again he was educated and showed today.  -All questions answered to the patient's satisfaction and the patient expressed understanding and agreement with the above listed treatment plan  -Follow up in 1 month's time for a routine 6-week postop visit. -Thank you for the clinical consultation and allowing me to participate in the patient's care. Electronically signed by Annika Marino MD on 5/23/22 at 8:34 AM EDT         Annika Marino MD       Orthopaedic Surgery-Sports Medicine      Disclaimer: This note was dictated with voice recognition software. Though review and correction are routinely performed, please contact the office/medical records for any errors requiring correction.

## 2022-05-24 ENCOUNTER — TELEPHONE (OUTPATIENT)
Dept: ORTHOPEDIC SURGERY | Age: 57
End: 2022-05-24

## 2022-05-24 DIAGNOSIS — S83.232D COMPLEX TEAR OF MEDIAL MENISCUS OF LEFT KNEE AS CURRENT INJURY, SUBSEQUENT ENCOUNTER: ICD-10-CM

## 2022-05-24 DIAGNOSIS — S83.272D COMPLEX TEAR OF LATERAL MENISCUS OF LEFT KNEE AS CURRENT INJURY, SUBSEQUENT ENCOUNTER: Primary | ICD-10-CM

## 2022-05-24 NOTE — TELEPHONE ENCOUNTER
Medical Facility Question     Facility Name: [id number]  Name: Eva Fernandez Number: [phone]  Request or Information: PRESCRIPTION FOR PHYSICAL THERAPY AND POST OP NOTES FAXED TO [phone].

## 2023-06-01 NOTE — TELEPHONE ENCOUNTER
ERRR   General Question     Subject: PATIENT CALLING ABOUT DENIAL OF RX. REQUESTING A CALL BACK.   Patient: Yonas Garcia Number: [phone]

## (undated) DEVICE — LIGHT HANDLE: Brand: DEVON

## (undated) DEVICE — SET ADMIN PRIMING 7ML L30IN 7.35LB 20 GTT 2ND RLER CLMP

## (undated) DEVICE — PADDING CAST W6INXL4YD NONSTERILE COT RAYON MICROPLEATED

## (undated) DEVICE — TUBE IRRIG L8IN LNG PT W/ CONN FOR PMP SYS REDEUCE

## (undated) DEVICE — SOLUTION IV 1000ML LAC RINGERS PH 6.5 INJ USP VIAFLX PLAS

## (undated) DEVICE — CATHETER IV 20GA L1.25IN PNK FEP SFTY STR HUB RADPQ DISP

## (undated) DEVICE — SOLUTION IRRIG 5L LAC R BG

## (undated) DEVICE — BANDAGE COMPR W6XL12FT SGL LAYERED NO CLSR EXSANGUATION

## (undated) DEVICE — DRAPE 54X23IN MAYO STAND COVER REINF

## (undated) DEVICE — T-DRAPE,EXTREMITY,STERILE: Brand: MEDLINE

## (undated) DEVICE — APPLICATOR MEDICATED 10.5 CC SOLUTION HI LT ORNG CHLORAPREP

## (undated) DEVICE — 4.5 MM FULL RADIUS STRAIGHT                                    BLADES, POWER/EP-1, YELLOW, PACKAGED                                    6 PER BOX, STERILE: Brand: DYONICS

## (undated) DEVICE — SET GRAV VENT NVENT CK VLV 3 NDL FREE PRT 10 GTT

## (undated) DEVICE — BANDAGE COMPR M W6INXL15YD WHT BGE VELC E MTRX HK AND LOOP

## (undated) DEVICE — DRESSING,GAUZE,XEROFORM,CURAD,1"X8",ST: Brand: CURAD

## (undated) DEVICE — WEREWOLF FLOW 90 COBLATION WAND: Brand: COBLATION

## (undated) DEVICE — STERILE POLYISOPRENE POWDER-FREE SURGICAL GLOVES: Brand: PROTEXIS

## (undated) DEVICE — GLOVE SURG SZ 8 L12IN FNGR THK79MIL GRN LTX FREE

## (undated) DEVICE — GLOVE SURG SZ 8 L12IN FNGR THK94MIL STD WHT LTX FREE

## (undated) DEVICE — 3M™ TEGADERM™ TRANSPARENT FILM DRESSING FRAME STYLE, 1624W, 2-3/8 IN X 2-3/4 IN (6 CM X 7 CM), 100/CT 4CT/CASE: Brand: 3M™ TEGADERM™

## (undated) DEVICE — SUTURE NONABSORBABLE MONOFILAMENT 3-0 PS-1 18 IN BLK ETHILON 1663H

## (undated) DEVICE — PACK PROCEDURE SURG ARTHSCP CUST

## (undated) DEVICE — GAUZE,SPONGE,4"X4",16PLY,STRL,LF,10/TRAY: Brand: MEDLINE

## (undated) DEVICE — ZIMMER® STERILE DISPOSABLE TOURNIQUET CUFF WITH PLC, DUAL PORT, SINGLE BLADDER, 30 IN. (76 CM)